# Patient Record
Sex: FEMALE | Race: WHITE | NOT HISPANIC OR LATINO | URBAN - METROPOLITAN AREA
[De-identification: names, ages, dates, MRNs, and addresses within clinical notes are randomized per-mention and may not be internally consistent; named-entity substitution may affect disease eponyms.]

---

## 2017-02-08 ENCOUNTER — APPOINTMENT (OUTPATIENT)
Age: 32
Setting detail: DERMATOLOGY
End: 2017-02-12

## 2017-02-08 VITALS
WEIGHT: 230 LBS | DIASTOLIC BLOOD PRESSURE: 69 MMHG | HEIGHT: 67 IN | SYSTOLIC BLOOD PRESSURE: 119 MMHG | HEART RATE: 122 BPM

## 2017-02-08 DIAGNOSIS — R20.8 OTHER DISTURBANCES OF SKIN SENSATION: ICD-10-CM

## 2017-02-08 DIAGNOSIS — E66.9 OBESITY, UNSPECIFIED: ICD-10-CM

## 2017-02-08 DIAGNOSIS — L40.4 GUTTATE PSORIASIS: ICD-10-CM

## 2017-02-08 PROCEDURE — OTHER MIPS QUALITY: OTHER

## 2017-02-08 PROCEDURE — OTHER PATIENT SPECIFIC COUNSELING: OTHER

## 2017-02-08 PROCEDURE — 99203 OFFICE O/P NEW LOW 30 MIN: CPT

## 2017-02-08 PROCEDURE — OTHER PRESCRIPTION: OTHER

## 2017-02-08 PROCEDURE — OTHER COUNSELING: OTHER

## 2017-02-08 RX ORDER — TRIAMCINOLONE ACETONIDE 1 MG/G
CREAM TOPICAL
Qty: 1 | Refills: 3 | Status: ERX | COMMUNITY
Start: 2017-02-08

## 2017-02-08 ASSESSMENT — LOCATION ZONE DERM
LOCATION ZONE: LEG
LOCATION ZONE: TRUNK
LOCATION ZONE: ARM

## 2017-02-08 ASSESSMENT — LOCATION SIMPLE DESCRIPTION DERM
LOCATION SIMPLE: CHEST
LOCATION SIMPLE: LEFT POSTERIOR THIGH
LOCATION SIMPLE: ABDOMEN
LOCATION SIMPLE: LEFT THIGH
LOCATION SIMPLE: RIGHT POSTERIOR THIGH
LOCATION SIMPLE: RIGHT THIGH
LOCATION SIMPLE: LEFT LOWER BACK
LOCATION SIMPLE: LEFT POSTERIOR UPPER ARM
LOCATION SIMPLE: RIGHT POSTERIOR UPPER ARM

## 2017-02-08 ASSESSMENT — LOCATION DETAILED DESCRIPTION DERM
LOCATION DETAILED: RIGHT PROXIMAL POSTERIOR UPPER ARM
LOCATION DETAILED: EPIGASTRIC SKIN
LOCATION DETAILED: LEFT MEDIAL SUPERIOR CHEST
LOCATION DETAILED: RIGHT ANTERIOR DISTAL THIGH
LOCATION DETAILED: LEFT ANTERIOR DISTAL THIGH
LOCATION DETAILED: LEFT DISTAL POSTERIOR THIGH
LOCATION DETAILED: LEFT SUPERIOR MEDIAL MIDBACK
LOCATION DETAILED: RIGHT DISTAL POSTERIOR THIGH
LOCATION DETAILED: LEFT PROXIMAL POSTERIOR UPPER ARM

## 2017-02-08 ASSESSMENT — BSA PSORIASIS: % BODY COVERED IN PSORIASIS: 15

## 2017-02-08 ASSESSMENT — PAIN INTENSITY VAS: HOW INTENSE IS YOUR PAIN 0 BEING NO PAIN, 10 BEING THE MOST SEVERE PAIN POSSIBLE?: 5/10 PAIN

## 2017-02-08 ASSESSMENT — PGA PSORIASIS: PGA PSORIASIS: MILD (MILD PLAQUE ELEVATION, LIGHT ERYTHEMA, FINE SCALE PREDOMINATES)

## 2017-02-08 NOTE — PROCEDURE: PATIENT SPECIFIC COUNSELING
Natural sunlight 10-15 minutes twice daily will help condition. May continue Hydroxyzine QHS PRN. Case discussed with Dr. Mera.
Detail Level: Zone

## 2017-02-08 NOTE — PROCEDURE: MIPS QUALITY
Quality 226: Preventive Care And Screening: Tobacco Use: Screening And Cessation Intervention: Patient screened for tobacco and never smoked
Quality 128: Preventive Care And Screening: Body Mass Index (Bmi) Screening And Follow-Up Plan: BMI is documented above normal parameters and a follow-up plan is documented
Detail Level: Generalized
Quality 431: Preventive Care And Screening: Unhealthy Alcohol Use - Screening: Patient screened for unhealthy alcohol use using a single question and scores less than 2 times per year
Quality 131: Pain Assessment And Follow-Up: Pain assessment documented as positive using a standardized tool AND a follow-up plan is documented

## 2017-03-08 ENCOUNTER — APPOINTMENT (OUTPATIENT)
Age: 32
Setting detail: DERMATOLOGY
End: 2017-03-09

## 2017-03-08 VITALS
HEIGHT: 67 IN | HEART RATE: 92 BPM | DIASTOLIC BLOOD PRESSURE: 74 MMHG | SYSTOLIC BLOOD PRESSURE: 111 MMHG | WEIGHT: 220 LBS

## 2017-03-08 DIAGNOSIS — L40.4 GUTTATE PSORIASIS: ICD-10-CM

## 2017-03-08 PROCEDURE — OTHER MIPS QUALITY: OTHER

## 2017-03-08 PROCEDURE — OTHER COUNSELING: OTHER

## 2017-03-08 PROCEDURE — OTHER TREATMENT REGIMEN: OTHER

## 2017-03-08 PROCEDURE — 99214 OFFICE O/P EST MOD 30 MIN: CPT

## 2017-03-08 PROCEDURE — OTHER PATIENT SPECIFIC COUNSELING: OTHER

## 2017-03-08 ASSESSMENT — LOCATION ZONE DERM
LOCATION ZONE: ARM
LOCATION ZONE: LEG
LOCATION ZONE: TRUNK

## 2017-03-08 ASSESSMENT — PGA PSORIASIS: PGA PSORIASIS: MILD (MILD PLAQUE ELEVATION, LIGHT ERYTHEMA, FINE SCALE PREDOMINATES)

## 2017-03-08 ASSESSMENT — LOCATION DETAILED DESCRIPTION DERM
LOCATION DETAILED: LEFT PROXIMAL DORSAL FOREARM
LOCATION DETAILED: LEFT VENTRAL PROXIMAL FOREARM
LOCATION DETAILED: LEFT PROXIMAL POSTERIOR UPPER ARM
LOCATION DETAILED: RIGHT ANTERIOR DISTAL THIGH
LOCATION DETAILED: UPPER STERNUM
LOCATION DETAILED: LEFT SUPERIOR MEDIAL UPPER BACK
LOCATION DETAILED: RIGHT PROXIMAL DORSAL FOREARM

## 2017-03-08 ASSESSMENT — LOCATION SIMPLE DESCRIPTION DERM
LOCATION SIMPLE: RIGHT THIGH
LOCATION SIMPLE: RIGHT FOREARM
LOCATION SIMPLE: LEFT POSTERIOR UPPER ARM
LOCATION SIMPLE: LEFT FOREARM
LOCATION SIMPLE: LEFT UPPER BACK
LOCATION SIMPLE: CHEST

## 2017-03-08 ASSESSMENT — BSA PSORIASIS: % BODY COVERED IN PSORIASIS: 10

## 2017-03-08 NOTE — PROCEDURE: TREATMENT REGIMEN
Action 3: Continue
Treatment 1: Triamcinolone 0.1% cream
Sig For Treatment 1 (If Needed): twice daily
Detail Level: Zone

## 2017-03-08 NOTE — PROCEDURE: PATIENT SPECIFIC COUNSELING
Detail Level: Detailed
Discussed treatment options including UVB therapy two to three times weekly. Patient elects UVB treatment. Continue regimen of Hydroxyzine as needed prescribed by Dr. Mera.

## 2017-04-14 ENCOUNTER — APPOINTMENT (OUTPATIENT)
Age: 32
Setting detail: DERMATOLOGY
End: 2017-04-16

## 2017-04-14 VITALS — WEIGHT: 225 LBS | HEIGHT: 67 IN

## 2017-04-14 DIAGNOSIS — L40.4 GUTTATE PSORIASIS: ICD-10-CM

## 2017-04-14 PROCEDURE — OTHER COUNSELING: OTHER

## 2017-04-14 PROCEDURE — 99214 OFFICE O/P EST MOD 30 MIN: CPT

## 2017-04-14 PROCEDURE — OTHER PATIENT SPECIFIC COUNSELING: OTHER

## 2017-04-14 PROCEDURE — OTHER TREATMENT REGIMEN: OTHER

## 2017-04-14 ASSESSMENT — LOCATION ZONE DERM
LOCATION ZONE: TRUNK
LOCATION ZONE: LEG

## 2017-04-14 ASSESSMENT — LOCATION DETAILED DESCRIPTION DERM
LOCATION DETAILED: PERIUMBILICAL SKIN
LOCATION DETAILED: LEFT PROXIMAL PRETIBIAL REGION

## 2017-04-14 ASSESSMENT — BSA PSORIASIS: % BODY COVERED IN PSORIASIS: 1

## 2017-04-14 ASSESSMENT — LOCATION SIMPLE DESCRIPTION DERM
LOCATION SIMPLE: LEFT PRETIBIAL REGION
LOCATION SIMPLE: ABDOMEN

## 2017-04-14 ASSESSMENT — PGA PSORIASIS: PGA PSORIASIS: MINIMAL (MINIMAL PLAQUE ELEVATION, FAINT ERYTHEMA, OCCASIONAL FINE SCALE)

## 2017-04-14 NOTE — PROCEDURE: PATIENT SPECIFIC COUNSELING
Detail Level: Detailed
Condition is resolving well with current regimen. Advised to continue Triamcinolone BID as well as UVB therapy. May spot treat residual areas more frequently with Triamcinolone and application of a bandage to aid in resolving. Natural sunlight exposure for 10-15 minutes daily will also aid in improvement. However reiterated to avoid excessive exposure and possible burning to avoid flare of the condition. Patient verbalized understanding.

## 2017-04-14 NOTE — PROCEDURE: TREATMENT REGIMEN
Treatment 1: Triamcinolone 0.1% cream
Action 3: Continue
Sig For Treatment 1 (If Needed): twice daily
Treatment 2: UVB
Detail Level: Zone
Sig For Treatment 2 (If Needed): three times a week

## 2017-08-22 ENCOUNTER — APPOINTMENT (OUTPATIENT)
Age: 32
Setting detail: DERMATOLOGY
End: 2017-08-24

## 2017-08-22 VITALS
WEIGHT: 229 LBS | DIASTOLIC BLOOD PRESSURE: 51 MMHG | HEIGHT: 67 IN | SYSTOLIC BLOOD PRESSURE: 113 MMHG | HEART RATE: 91 BPM

## 2017-08-22 DIAGNOSIS — L30.1 DYSHIDROSIS [POMPHOLYX]: ICD-10-CM

## 2017-08-22 DIAGNOSIS — L40.4 GUTTATE PSORIASIS: ICD-10-CM

## 2017-08-22 PROCEDURE — OTHER MIPS QUALITY: OTHER

## 2017-08-22 PROCEDURE — 99213 OFFICE O/P EST LOW 20 MIN: CPT

## 2017-08-22 PROCEDURE — OTHER PATIENT SPECIFIC COUNSELING: OTHER

## 2017-08-22 PROCEDURE — OTHER COUNSELING: OTHER

## 2017-08-22 ASSESSMENT — LOCATION DETAILED DESCRIPTION DERM
LOCATION DETAILED: SUPERIOR LUMBAR SPINE
LOCATION DETAILED: 2ND WEB SPACE LEFT HAND
LOCATION DETAILED: LEFT PROXIMAL RADIAL DORSAL RING FINGER
LOCATION DETAILED: RIGHT PROXIMAL RADIAL DORSAL MIDDLE FINGER
LOCATION DETAILED: 4TH WEB SPACE LEFT HAND
LOCATION DETAILED: RIGHT PROXIMAL PRETIBIAL REGION
LOCATION DETAILED: LEFT PROXIMAL PRETIBIAL REGION
LOCATION DETAILED: RIGHT ANTERIOR DISTAL THIGH
LOCATION DETAILED: EPIGASTRIC SKIN
LOCATION DETAILED: LEFT ANTERIOR DISTAL THIGH
LOCATION DETAILED: 2ND WEB SPACE RIGHT HAND
LOCATION DETAILED: LEFT PROXIMAL DORSAL FOREARM
LOCATION DETAILED: 3RD WEB SPACE RIGHT HAND
LOCATION DETAILED: RIGHT PROXIMAL DORSAL FOREARM
LOCATION DETAILED: RIGHT PROXIMAL RADIAL DORSAL RING FINGER
LOCATION DETAILED: 3RD WEB SPACE LEFT HAND
LOCATION DETAILED: LEFT PROXIMAL DORSAL SMALL FINGER
LOCATION DETAILED: RIGHT PROXIMAL RADIAL DORSAL SMALL FINGER
LOCATION DETAILED: 4TH WEB SPACE RIGHT HAND

## 2017-08-22 ASSESSMENT — LOCATION SIMPLE DESCRIPTION DERM
LOCATION SIMPLE: RIGHT SMALL FINGER
LOCATION SIMPLE: RIGHT RING FINGER
LOCATION SIMPLE: ABDOMEN
LOCATION SIMPLE: RIGHT PRETIBIAL REGION
LOCATION SIMPLE: LEFT HAND
LOCATION SIMPLE: LEFT THIGH
LOCATION SIMPLE: RIGHT THIGH
LOCATION SIMPLE: LEFT RING FINGER
LOCATION SIMPLE: LOWER BACK
LOCATION SIMPLE: LEFT SMALL FINGER
LOCATION SIMPLE: RIGHT MIDDLE FINGER
LOCATION SIMPLE: RIGHT HAND
LOCATION SIMPLE: LEFT FOREARM
LOCATION SIMPLE: RIGHT FOREARM
LOCATION SIMPLE: LEFT PRETIBIAL REGION

## 2017-08-22 ASSESSMENT — LOCATION ZONE DERM
LOCATION ZONE: HAND
LOCATION ZONE: TRUNK
LOCATION ZONE: ARM
LOCATION ZONE: FINGER
LOCATION ZONE: LEG

## 2017-08-22 ASSESSMENT — PAIN INTENSITY VAS: HOW INTENSE IS YOUR PAIN 0 BEING NO PAIN, 10 BEING THE MOST SEVERE PAIN POSSIBLE?: NO PAIN

## 2017-08-22 ASSESSMENT — PGA PSORIASIS: PGA PSORIASIS: CLEAR (NO ELEVATION, SCALE, OR ERYTHEMA, EXCEPT FOR RESIDUAL DISCOLORATION)

## 2017-08-22 ASSESSMENT — BSA RASH: BSA RASH: 1

## 2017-08-22 ASSESSMENT — SEVERITY ASSESSMENT: SEVERITY: MILD TO MODERATE

## 2017-08-22 NOTE — PROCEDURE: PATIENT SPECIFIC COUNSELING
Explained etiology of condition in detail. Advised use of previously prescribed Triamcinolone for flares. We will re-evaluate in the winter.
Detail Level: Detailed

## 2017-08-22 NOTE — HPI: RASH
How Severe Is Your Rash?: mild
Is This A New Presentation, Or A Follow-Up?: Rash
Additional History: Pain 0/10. Patient states the condition was previously treated with an unknown topical steroid however she is currently not on any treatment.

## 2023-03-27 ENCOUNTER — APPOINTMENT (OUTPATIENT)
Dept: URBAN - METROPOLITAN AREA CLINIC 232 | Age: 38
Setting detail: DERMATOLOGY
End: 2023-03-27

## 2023-03-27 DIAGNOSIS — L40.0 PSORIASIS VULGARIS: ICD-10-CM

## 2023-03-27 DIAGNOSIS — Z79.899 OTHER LONG TERM (CURRENT) DRUG THERAPY: ICD-10-CM

## 2023-03-27 PROCEDURE — OTHER COUNSELING: OTHER

## 2023-03-27 PROCEDURE — OTHER SKYRIZI INITIATION: OTHER

## 2023-03-27 PROCEDURE — OTHER PRESCRIPTION: OTHER

## 2023-03-27 PROCEDURE — 99204 OFFICE O/P NEW MOD 45 MIN: CPT

## 2023-03-27 PROCEDURE — OTHER HIGH RISK MEDICATION MONITORING: OTHER

## 2023-03-27 PROCEDURE — OTHER TREATMENT REGIMEN: OTHER

## 2023-03-27 PROCEDURE — OTHER MIPS QUALITY: OTHER

## 2023-03-27 PROCEDURE — OTHER ORDER TESTS: OTHER

## 2023-03-27 RX ORDER — TRIAMCINOLONE ACETONIDE 1 MG/G
CREAM TOPICAL
Qty: 454 | Refills: 2 | Status: ERX | COMMUNITY
Start: 2023-03-27

## 2023-03-27 RX ORDER — RISANKIZUMAB-RZAA 150 MG/ML
INJECTION SUBCUTANEOUS
Qty: 1 | Refills: 1 | Status: ERX

## 2023-03-27 RX ORDER — RISANKIZUMAB-RZAA 150 MG/ML
INJECTION SUBCUTANEOUS
Qty: 2 | Refills: 0 | Status: ERX | COMMUNITY
Start: 2023-03-27

## 2023-03-27 ASSESSMENT — LOCATION ZONE DERM
LOCATION ZONE: LEG
LOCATION ZONE: TRUNK

## 2023-03-27 ASSESSMENT — LOCATION DETAILED DESCRIPTION DERM
LOCATION DETAILED: RIGHT SUPERIOR MEDIAL MIDBACK
LOCATION DETAILED: LEFT PROXIMAL PRETIBIAL REGION
LOCATION DETAILED: PERIUMBILICAL SKIN

## 2023-03-27 ASSESSMENT — PGA PSORIASIS: PGA PSORIASIS 2020: MODERATE

## 2023-03-27 ASSESSMENT — LOCATION SIMPLE DESCRIPTION DERM
LOCATION SIMPLE: LEFT PRETIBIAL REGION
LOCATION SIMPLE: RIGHT LOWER BACK
LOCATION SIMPLE: ABDOMEN

## 2023-03-27 ASSESSMENT — BSA PSORIASIS: % BODY COVERED IN PSORIASIS: 12

## 2023-03-27 NOTE — PROCEDURE: SKYRIZI INITIATION
Detail Level: Zone
Skyrizi Dosing: 150 mg SC week 0 and week 4 then every 12 weeks thereafter
Add Pregnancy And Lactation Warning To Medication Counseling?: Yes
Is Cyclosporine Contraindicated?: No
Pregnancy And Lactation Warning Text: The risk during pregnancy and breastfeeding is uncertain with this medication.
Skyrizi Monitoring Guidelines: A yearly test for tuberculosis is required while taking Skyrizi.
Diagnosis (Required): Psoriasis

## 2023-03-27 NOTE — PROCEDURE: TREATMENT REGIMEN
Detail Level: Zone
Continue Regimen: triamcinolone acetonide 0.1 % topical cream (Apply to affected areas for rash on back,legs, arms,and trunk  twice daily as needed for flares )
Action 4: Continue

## 2023-03-27 NOTE — PROCEDURE: ORDER TESTS
Performing Laboratory: 0
Expected Date Of Service: 03/27/2023
Billing Type: Third-Party Bill
Bill For Surgical Tray: no

## 2023-03-27 NOTE — HPI: RASH
What Type Of Note Output Would You Prefer (Optional)?: Standard Output
How Severe Is Your Rash?: moderate
Is This A New Presentation, Or A Follow-Up?: Rash
Additional History: Patient states she recently had Shirley’s fever and strep throat, was on two antibiotics for course of two weeks, with no resolution of rash.

## 2023-03-27 NOTE — PROCEDURE: COUNSELING
Detail Level: Detailed
Patient Specific Counseling (Will Not Stick From Patient To Patient): Discussed with patient that a majority of her lesions are guttate like and consistent with that type of psoriasis.

## 2023-03-27 NOTE — PROCEDURE: HIGH RISK MEDICATION MONITORING
Endocrinology Azelaic Acid Counseling: Patient counseled that medicine may cause skin irritation and to avoid applying near the eyes.  In the event of skin irritation, the patient was advised to reduce the amount of the drug applied or use it less frequently.   The patient verbalized understanding of the proper use and possible adverse effects of azelaic acid.  All of the patient's questions and concerns were addressed.

## 2023-04-04 ENCOUNTER — APPOINTMENT (OUTPATIENT)
Dept: URBAN - METROPOLITAN AREA CLINIC 232 | Age: 38
Setting detail: DERMATOLOGY
End: 2023-04-04

## 2023-04-04 ENCOUNTER — RX ONLY (RX ONLY)
Age: 38
End: 2023-04-04

## 2023-04-04 DIAGNOSIS — Z79.899 OTHER LONG TERM (CURRENT) DRUG THERAPY: ICD-10-CM

## 2023-04-04 DIAGNOSIS — L40.0 PSORIASIS VULGARIS: ICD-10-CM

## 2023-04-04 PROCEDURE — OTHER ORDER TESTS: OTHER

## 2023-04-04 PROCEDURE — OTHER SKYRIZI MONITORING: OTHER

## 2023-04-04 PROCEDURE — OTHER MIPS QUALITY: OTHER

## 2023-04-04 PROCEDURE — OTHER COUNSELING: OTHER

## 2023-04-04 PROCEDURE — OTHER SKYRIZI INJECTION: OTHER

## 2023-04-04 PROCEDURE — 99212 OFFICE O/P EST SF 10 MIN: CPT | Mod: 25

## 2023-04-04 PROCEDURE — OTHER TREATMENT REGIMEN: OTHER

## 2023-04-04 PROCEDURE — 96372 THER/PROPH/DIAG INJ SC/IM: CPT

## 2023-04-04 PROCEDURE — OTHER HIGH RISK MEDICATION MONITORING: OTHER

## 2023-04-04 RX ORDER — RISANKIZUMAB-RZAA 150 MG/ML
INJECTION SUBCUTANEOUS
Qty: 2 | Refills: 0 | Status: ERX

## 2023-04-04 RX ORDER — RISANKIZUMAB-RZAA 150 MG/ML
INJECTION SUBCUTANEOUS
Qty: 1 | Refills: 1 | Status: ERX

## 2023-04-04 ASSESSMENT — LOCATION ZONE DERM
LOCATION ZONE: LEG
LOCATION ZONE: TRUNK

## 2023-04-04 ASSESSMENT — LOCATION DETAILED DESCRIPTION DERM
LOCATION DETAILED: LEFT PROXIMAL PRETIBIAL REGION
LOCATION DETAILED: RIGHT SUPERIOR MEDIAL MIDBACK
LOCATION DETAILED: RIGHT ANTERIOR PROXIMAL THIGH
LOCATION DETAILED: PERIUMBILICAL SKIN

## 2023-04-04 ASSESSMENT — LOCATION SIMPLE DESCRIPTION DERM
LOCATION SIMPLE: LEFT PRETIBIAL REGION
LOCATION SIMPLE: RIGHT THIGH
LOCATION SIMPLE: ABDOMEN
LOCATION SIMPLE: RIGHT LOWER BACK

## 2023-04-04 NOTE — PROCEDURE: HIGH RISK MEDICATION MONITORING
Quality 110: Preventive Care And Screening: Influenza Immunization: Influenza Immunization previously received during influenza season Detail Level: Zone Adbry Counseling: I discussed with the patient the risks of tralokinumab including but not limited to eye infection and irritation, cold sores, injection site reactions, worsening of asthma, allergic reactions and increased risk of parasitic infection.  Live vaccines should be avoided while taking tralokinumab. The patient understands that monitoring is required and they must alert us or the primary physician if symptoms of infection or other concerning signs are noted.

## 2023-04-04 NOTE — PROCEDURE: TREATMENT REGIMEN
Action 3: Continue
Detail Level: Zone
Continue Regimen: triamcinolone acetonide 0.1 % topical cream (Apply to affected areas for rash on back,legs, arms,and trunk  twice daily)\\n\\nSkyrizi 150 mg/mL subcutaneous pen injector (Inject 150 mg SubQ in 4 weeks, then every 12 weeks (maintenance dose)

## 2023-04-06 ENCOUNTER — RX ONLY (RX ONLY)
Age: 38
End: 2023-04-06

## 2023-04-06 RX ORDER — RISANKIZUMAB-RZAA 150 MG/ML
INJECTION SUBCUTANEOUS
Qty: 1 | Refills: 1 | Status: ERX

## 2023-04-06 RX ORDER — RISANKIZUMAB-RZAA 150 MG/ML
INJECTION SUBCUTANEOUS
Qty: 2 | Refills: 0 | Status: ERX

## 2023-04-18 ENCOUNTER — APPOINTMENT (OUTPATIENT)
Dept: URBAN - METROPOLITAN AREA CLINIC 232 | Age: 38
Setting detail: DERMATOLOGY
End: 2023-04-18

## 2023-04-18 DIAGNOSIS — L40.0 PSORIASIS VULGARIS: ICD-10-CM

## 2023-04-18 DIAGNOSIS — Z79.899 OTHER LONG TERM (CURRENT) DRUG THERAPY: ICD-10-CM

## 2023-04-18 DIAGNOSIS — B37.0 CANDIDAL STOMATITIS: ICD-10-CM

## 2023-04-18 PROBLEM — F41.9 ANXIETY DISORDER, UNSPECIFIED: Status: ACTIVE | Noted: 2023-04-18

## 2023-04-18 PROBLEM — L55.1 SUNBURN OF SECOND DEGREE: Status: ACTIVE | Noted: 2023-04-18

## 2023-04-18 PROBLEM — K21.9 GASTRO-ESOPHAGEAL REFLUX DISEASE WITHOUT ESOPHAGITIS: Status: ACTIVE | Noted: 2023-04-18

## 2023-04-18 PROBLEM — L20.84 INTRINSIC (ALLERGIC) ECZEMA: Status: ACTIVE | Noted: 2023-04-18

## 2023-04-18 PROCEDURE — 99213 OFFICE O/P EST LOW 20 MIN: CPT | Mod: 25

## 2023-04-18 PROCEDURE — OTHER SKYRIZI INJECTION: OTHER

## 2023-04-18 PROCEDURE — OTHER COUNSELING: OTHER

## 2023-04-18 PROCEDURE — 96372 THER/PROPH/DIAG INJ SC/IM: CPT

## 2023-04-18 PROCEDURE — OTHER TREATMENT REGIMEN: OTHER

## 2023-04-18 PROCEDURE — OTHER HIGH RISK MEDICATION MONITORING: OTHER

## 2023-04-18 PROCEDURE — OTHER PRESCRIPTION: OTHER

## 2023-04-18 PROCEDURE — OTHER SKYRIZI MONITORING: OTHER

## 2023-04-18 RX ORDER — FLUCONAZOLE 150 MG/1
TABLET ORAL
Qty: 6 | Refills: 1 | Status: ERX | COMMUNITY
Start: 2023-04-18

## 2023-04-18 ASSESSMENT — LOCATION DETAILED DESCRIPTION DERM
LOCATION DETAILED: RIGHT ANTERIOR PROXIMAL THIGH
LOCATION DETAILED: RIGHT SUPERIOR MEDIAL MIDBACK
LOCATION DETAILED: PERIUMBILICAL SKIN
LOCATION DETAILED: LEFT PROXIMAL PRETIBIAL REGION

## 2023-04-18 ASSESSMENT — LOCATION SIMPLE DESCRIPTION DERM
LOCATION SIMPLE: RIGHT LOWER BACK
LOCATION SIMPLE: ABDOMEN
LOCATION SIMPLE: LEFT PRETIBIAL REGION
LOCATION SIMPLE: RIGHT THIGH

## 2023-04-18 ASSESSMENT — LOCATION ZONE DERM
LOCATION ZONE: TRUNK
LOCATION ZONE: LEG

## 2023-04-18 NOTE — PROCEDURE: HIGH RISK MEDICATION MONITORING
Pt. Stopped into clinic asking for a jury dismissal letter from Jury duty on March 1 and 2nd due to pt. Having back issues and unable to do jury duty.    Please advise letter is ready if approved.   Oral Minoxidil Pregnancy And Lactation Text: This medication should only be used when clearly needed if you are pregnant, attempting to become pregnant or breast feeding.

## 2023-04-18 NOTE — PROCEDURE: TREATMENT REGIMEN
Action 3: Continue
Detail Level: Zone
Other Instructions: Patient to push next follow up appointment to June for 1 month follow up and is due for her second and final injection.
Continue Regimen: triamcinolone acetonide 0.1 % topical cream (Apply to affected areas for rash on back,legs, arms,and trunk  twice daily)\\n\\nSkyrizi 150 mg/mL subcutaneous pen injector (Inject 150 mg SubQ in 4 weeks, then every 12 weeks (maintenance dose)
Initiate Treatment: fluconazole 150 mg tablet, Take one tablet x1 repeat in one to two days if symptoms persist.

## 2023-04-18 NOTE — PROCEDURE: HIGH RISK MEDICATION MONITORING
The phone staff put the wrong number in the message the right number is [phone].   Oxybutynin Counseling:  I discussed with the patient the risks of oxybutynin including but not limited to skin rash, drowsiness, dry mouth, difficulty urinating, and blurred vision.

## 2023-06-05 ENCOUNTER — APPOINTMENT (OUTPATIENT)
Dept: URBAN - METROPOLITAN AREA CLINIC 232 | Age: 38
Setting detail: DERMATOLOGY
End: 2023-06-05

## 2023-06-05 VITALS — HEIGHT: 67 IN | WEIGHT: 250 LBS

## 2023-06-05 DIAGNOSIS — Z79.899 OTHER LONG TERM (CURRENT) DRUG THERAPY: ICD-10-CM

## 2023-06-05 DIAGNOSIS — L40.0 PSORIASIS VULGARIS: ICD-10-CM

## 2023-06-05 PROCEDURE — OTHER PRESCRIPTION: OTHER

## 2023-06-05 PROCEDURE — OTHER PRESCRIPTION SAMPLES PROVIDED: OTHER

## 2023-06-05 PROCEDURE — 99213 OFFICE O/P EST LOW 20 MIN: CPT

## 2023-06-05 PROCEDURE — OTHER COUNSELING: OTHER

## 2023-06-05 PROCEDURE — OTHER TREATMENT REGIMEN: OTHER

## 2023-06-05 PROCEDURE — OTHER HIGH RISK MEDICATION MONITORING: OTHER

## 2023-06-05 PROCEDURE — OTHER SOTYKTU INITIATION: OTHER

## 2023-06-05 RX ORDER — DEUCRAVACITINIB 6 MG/1
TABLET, FILM COATED ORAL
Qty: 90 | Refills: 3 | Status: ERX | COMMUNITY
Start: 2023-06-05

## 2023-06-05 ASSESSMENT — BSA PSORIASIS: % BODY COVERED IN PSORIASIS: 0

## 2023-06-05 ASSESSMENT — LOCATION ZONE DERM
LOCATION ZONE: LEG
LOCATION ZONE: TRUNK

## 2023-06-05 ASSESSMENT — LOCATION DETAILED DESCRIPTION DERM
LOCATION DETAILED: PERIUMBILICAL SKIN
LOCATION DETAILED: LEFT PROXIMAL PRETIBIAL REGION
LOCATION DETAILED: RIGHT SUPERIOR MEDIAL MIDBACK

## 2023-06-05 ASSESSMENT — LOCATION SIMPLE DESCRIPTION DERM
LOCATION SIMPLE: ABDOMEN
LOCATION SIMPLE: RIGHT LOWER BACK
LOCATION SIMPLE: LEFT PRETIBIAL REGION

## 2023-06-05 ASSESSMENT — PGA PSORIASIS: PGA PSORIASIS 2020: CLEAR

## 2023-06-05 NOTE — PROCEDURE: TREATMENT REGIMEN
Continue Regimen: triamcinolone acetonide 0.1 % topical cream, Apply to affected areas for rash on back,legs, arms,and trunk twice daily prn.

## 2023-06-05 NOTE — PROCEDURE: TREATMENT REGIMEN
Discontinue Regimen: Skyrizi 150 mg/mL subcutaneous pen injector (Inject 150 mg SubQ in 4 weeks, then every 12 weeks. (maintenance dose)

## 2023-06-05 NOTE — PROCEDURE: SOTYKTU INITIATION
Sotyktu Dosing: 6mg Daily
Is Cyclosporine Contraindicated?: No
Diagnosis (Required): Psoriasis
Detail Level: Generalized
Pregnancy And Lactation Warning Text: There is insufficient data to evaluate whether or not Sotyktu is safe to use during pregnancy.   It is not known if Sotyktu passes into breast milk and whether or not it is safe to use when breastfeeding.  
Sotyktu Monitoring Guidelines: LFTs, lipids, hepatitis screening, TB screening and pregnancy tests should be checked prior to initiating Sotyktu therapy.  Labs may also be checked periodically after the initiation period.

## 2023-06-05 NOTE — PROCEDURE: TREATMENT REGIMEN
Plan: Patient's skin has been doing well, however, she is experiencing side effects.   We discussed discontinuing skyrizi due to side effects weight gain, swelling in the legs/feet and bones hurting all over her body. Patient would rather have patches of psoriasis then feel the way she is feeling. Patient considered trying an oral treatment approach. Patient to have labs she had drawn previously from pcp faxed to our office. Other Instructions: Patient's skin has been doing well, however, she is experiencing side effects.   We discussed discontinuing skyrizi due to side effects weight gain, swelling in the legs/feet and bones hurting all over her body. Patient would rather have patches of psoriasis then feel the way she is feeling. Patient considered trying an oral treatment approach. Patient to have labs she had drawn previously from pcp faxed to our office.

## 2023-06-05 NOTE — PROCEDURE: PRESCRIPTION SAMPLES PROVIDED
Samples Given: Sotyktu
Detail Level: Zone
Lot/Batch Number (Optional): [id number]
Expiration Date (Optional): 7/2024

## 2024-02-07 NOTE — PROCEDURE: HIGH RISK MEDICATION MONITORING
Discharge Planning Assessment  [facility]     Patient Name: Bernard Matta  MRN: [id number]  Today's Date: 2/7/2024    Admit Date: 2/6/2024        Discharge Needs Assessment       Row Name 02/07/24 1049       Living Environment    People in Home spouse    Name(s) of People in Home Nury Matta    Current Living Arrangements home    Potentially Unsafe Housing Conditions none    In the past 12 months has the electric, gas, oil, or water company threatened to shut off services in your home? No    Primary Care Provided by self    Provides Primary Care For no one    Family Caregiver if Needed spouse    Family Caregiver Names Nury Matta    Quality of Family Relationships supportive;helpful;involved    Able to Return to Prior Arrangements yes       Resource/Environmental Concerns    Resource/Environmental Concerns none    Transportation Concerns none       Transportation Needs    In the past 12 months, has lack of transportation kept you from medical appointments or from getting medications? no    In the past 12 months, has lack of transportation kept you from meetings, work, or from getting things needed for daily living? No       Food Insecurity    Within the past 12 months, you worried that your food would run out before you got the money to buy more. Never true    Within the past 12 months, the food you bought just didn't last and you didn't have money to get more. Never true       Transition Planning    Patient/Family Anticipates Transition to home with family    Patient/Family Anticipated Services at Transition none    Transportation Anticipated family or friend will provide       Discharge Needs Assessment    Readmission Within the Last 30 Days no previous admission in last 30 days    Equipment Currently Used at Home none    Concerns to be Addressed no discharge needs identified;denies needs/concerns at this time    Anticipated Changes Related to Illness none    Equipment Needed After Discharge none    Current  Discharge Risk chronically ill    Discharge Coordination/Progress SW spoke with patient's spouse to complete dc planning assessment.  Spouse advised patient is independent with ADL's at home.  Spouse states patient does not drive but she takes him where he needs to go.  Patient has a PCP and RX coverage.  Patient and spouse plan for patient to return home upon discharge and deny discharge needs.  SW following.                   Discharge Plan    No documentation.                 Continued Care and Services - Admitted Since 2/6/2024    Coordination has not been started for this encounter.          Demographic Summary    No documentation.                  Functional Status    No documentation.                  Psychosocial    No documentation.                  Abuse/Neglect    No documentation.                  Legal    No documentation.                  Substance Abuse    No documentation.                  Patient Forms    No documentation.                     NEHAL Hopkins     Cibinqo Counseling: I discussed with the patient the risks of Cibinqo therapy including but not limited to common cold, nausea, headache, cold sores, increased blood CPK levels, dizziness, UTIs, fatigue, acne, and vomitting. Live vaccines should be avoided.  This medication has been linked to serious infections; higher rate of mortality; malignancy and lymphoproliferative disorders; major adverse cardiovascular events; thrombosis; thrombocytopenia and lymphopenia; lipid elevations; and retinal detachment.

## 2025-02-20 NOTE — PROCEDURE: HIGH RISK MEDICATION MONITORING
Her/She Simponi Counseling:  I discussed with the patient the risks of golimumab including but not limited to myelosuppression, immunosuppression, autoimmune hepatitis, demyelinating diseases, lymphoma, and serious infections.  The patient understands that monitoring is required including a PPD at baseline and must alert us or the primary physician if symptoms of infection or other concerning signs are noted.

## 2025-03-28 NOTE — PROCEDURE: MIPS QUALITY
Detail Level: Zone
[FreeTextEntry3] : Focused exam only (see below) per patient request: - gritty erythematous scaly papules on the scalp, 1 on vertex hyperkeratotic  - waxy stuck-on brown plaques on face/scalp  - verrucous soft brown plaque on the left scrotum 
Quality 131: Pain Assessment And Follow-Up: Pain assessment using a standardized tool is documented as negative, no follow-up plan required
Quality 226: Preventive Care And Screening: Tobacco Use: Screening And Cessation Intervention: Patient screened for tobacco and never smoked
PROVIDER:[TOKEN:[[id number]]]